# Patient Record
Sex: FEMALE | Race: BLACK OR AFRICAN AMERICAN | NOT HISPANIC OR LATINO | Employment: FULL TIME | URBAN - METROPOLITAN AREA
[De-identification: names, ages, dates, MRNs, and addresses within clinical notes are randomized per-mention and may not be internally consistent; named-entity substitution may affect disease eponyms.]

---

## 2024-04-12 ENCOUNTER — HOSPITAL ENCOUNTER (EMERGENCY)
Facility: HOSPITAL | Age: 39
Discharge: HOME | End: 2024-04-12
Attending: EMERGENCY MEDICINE

## 2024-04-12 VITALS
BODY MASS INDEX: 39.56 KG/M2 | SYSTOLIC BLOOD PRESSURE: 128 MMHG | DIASTOLIC BLOOD PRESSURE: 89 MMHG | HEIGHT: 62 IN | RESPIRATION RATE: 18 BRPM | OXYGEN SATURATION: 97 % | HEART RATE: 91 BPM | WEIGHT: 215 LBS | TEMPERATURE: 98 F

## 2024-04-12 DIAGNOSIS — M79.671 RIGHT FOOT PAIN: ICD-10-CM

## 2024-04-12 DIAGNOSIS — L03.115 CELLULITIS OF RIGHT FOOT: Primary | ICD-10-CM

## 2024-04-12 LAB
B-HCG UR QL: NEGATIVE
CTP QC/QA: YES

## 2024-04-12 PROCEDURE — 81025 URINE PREGNANCY TEST: CPT | Mod: ER | Performed by: EMERGENCY MEDICINE

## 2024-04-12 PROCEDURE — 81025 URINE PREGNANCY TEST: CPT | Mod: ER

## 2024-04-12 PROCEDURE — 99283 EMERGENCY DEPT VISIT LOW MDM: CPT | Mod: 25,ER

## 2024-04-12 RX ORDER — CEPHALEXIN 500 MG/1
500 CAPSULE ORAL EVERY 6 HOURS
Qty: 40 CAPSULE | Refills: 0 | Status: SHIPPED | OUTPATIENT
Start: 2024-04-12 | End: 2024-04-22

## 2024-04-12 NOTE — Clinical Note
"David Man" Claudette was seen and treated in our emergency department on 4/12/2024.  She may return to work on 04/15/2024.       If you have any questions or concerns, please don't hesitate to call.      LISSETTE Solitario RN    "

## 2024-04-13 NOTE — ED PROVIDER NOTES
"Encounter Date: 4/12/2024       History     Chief Complaint   Patient presents with    Foot Pain     Right foot pain described as "burning" sensation to area around lower 5th toe. Denies hx of gout or DM2.      39-year-old female presenting for evaluation of pain, redness, and itching to the lateral right forefoot.  Symptoms began several days ago.  Patient thought that she had athlete's foot and used over-the-counter topical medication for it last night, however pain has gotten worse and redness has spread.  Denies any injury.  No history of similar symptoms.  Denies fever, ankle pain, or any other symptoms.    The history is provided by the patient. No  was used.     Review of patient's allergies indicates:  No Known Allergies  No past medical history on file.  No past surgical history on file.  No family history on file.     Review of Systems   Constitutional:  Negative for chills, fatigue and fever.   HENT:  Negative for congestion, ear pain, nosebleeds, postnasal drip, rhinorrhea, sinus pressure and sore throat.    Eyes:  Negative for photophobia and pain.   Respiratory:  Negative for apnea, cough, choking, chest tightness, shortness of breath, wheezing and stridor.    Cardiovascular:  Negative for chest pain, palpitations and leg swelling.   Gastrointestinal:  Negative for abdominal pain, constipation, diarrhea, nausea and vomiting.   Genitourinary:  Negative for dysuria, flank pain, hematuria, pelvic pain and vaginal discharge.   Musculoskeletal:  Negative for arthralgias, back pain, gait problem and neck pain.        + right foot pain   Skin:  Positive for color change. Negative for pallor, rash and wound.   Neurological:  Negative for dizziness, seizures, syncope, facial asymmetry, light-headedness and headaches.   Hematological:  Negative for adenopathy.   Psychiatric/Behavioral:  Negative for confusion. The patient is not nervous/anxious.        Physical Exam     Initial Vitals " [04/12/24 1936]   BP Pulse Resp Temp SpO2   (!) 137/93 102 18 98.2 °F (36.8 °C) 95 %      MAP       --         Physical Exam    Nursing note and vitals reviewed.  Constitutional: She appears well-developed and well-nourished. She is not diaphoretic. No distress.   HENT:   Head: Normocephalic and atraumatic.   Right Ear: External ear normal.   Left Ear: External ear normal.   Nose: Nose normal.   Eyes: Conjunctivae and EOM are normal. Right eye exhibits no discharge. Left eye exhibits no discharge.   Neck: Neck supple. No tracheal deviation present.   Normal range of motion.  Cardiovascular:  Normal rate.           Pulmonary/Chest: No stridor. No respiratory distress.   Abdominal: Abdomen is soft. She exhibits no distension. There is no abdominal tenderness.   Musculoskeletal:         General: No tenderness. Normal range of motion.      Cervical back: Normal range of motion and neck supple.     Neurological: She is alert and oriented to person, place, and time. She has normal strength. No cranial nerve deficit or sensory deficit.   Skin: Skin is warm, dry and intact. There is erythema.   Erythema, warmth, tenderness to the right lateral forefoot just proximal to the 4th and 5th toes.  There is also mild associated swelling.  No wounds.   Psychiatric: She has a normal mood and affect. Her behavior is normal. Judgment and thought content normal.         ED Course   Procedures  Labs Reviewed   POCT URINE PREGNANCY          Imaging Results              X-Ray Foot Complete Right (Final result)  Result time 04/12/24 20:54:08      Final result by Eneida Avalos MD (04/12/24 20:54:08)                   Impression:      Dorsal soft tissue swelling.  No acute bony abnormality detected.      Electronically signed by: Eneida Avalos  Date:    04/12/2024  Time:    20:54               Narrative:    EXAMINATION:  THREE VIEWS OF THE RIGHT FOOT    CLINICAL HISTORY:  Pain in right foot    TECHNIQUE:  AP, lateral, and oblique  view of the right foot    COMPARISON:  None.    FINDINGS:  There is dorsal soft tissue swelling.  Three views of the right foot demonstrate no acute fracture or dislocation.  There is a well corticated os ossific or calcific density at the Achilles tendon insertion, which may suggest Achilles tendinitis.                                       Medications - No data to display  Medical Decision Making  HPI and physical exam as above.  X-ray without evidence of acute bony abnormality.  Findings concerning for cellulitis.  Will treat with antibiotics.  No evidence of emergent pathology.  Follow up with PCP.  Shared decision-making with the patient.    Amount and/or Complexity of Data Reviewed  Labs: ordered. Decision-making details documented in ED Course.  Radiology: ordered. Decision-making details documented in ED Course.    Risk  Prescription drug management.                                      Clinical Impression:  Final diagnoses:  [M79.671] Right foot pain  [L03.115] Cellulitis of right foot (Primary)          ED Disposition Condition    Discharge Stable          ED Prescriptions       Medication Sig Dispense Start Date End Date Auth. Provider    cephALEXin (KEFLEX) 500 MG capsule Take 1 capsule (500 mg total) by mouth every 6 (six) hours. for 10 days 40 capsule 4/12/2024 4/22/2024 Cam Hanson, JACINTO          Follow-up Information       Follow up With Specialties Details Why Contact [facility]    [facility]  Schedule an appointment as soon as possible for a visit in 1 week For further evaluation [address] [facility] [place] [id number] [phone]      [facility] ED Emergency Medicine Go to  If symptoms worsen, As needed [address] [facility] [id number] [phone]             Cam Hanson, NP  04/12/24 2537

## 2024-04-13 NOTE — DISCHARGE INSTRUCTIONS
Take antibiotics once every 6 hours (4 times a day) as prescribed until they are gone.  You should not have any left over even if your symptoms get better.    Return to the emergency department for any new or worsening symptoms.    Thank you for coming to our Emergency Department today. It is important to remember that some problems are difficult to diagnose and may not be found during your first visit. Be sure to follow up with your primary care doctor.  If you do not have one, you may contact the one listed on your discharge paperwork or you may also call the [facility] Appointment Desk at [phone] to schedule an appointment with one.     Return to the ER with any questions/concerns, new/concerning symptoms, worsening or failure to improve. Do not drive or make any important decisions for 24 hours if you have received any pain medications, sedatives or mood altering drugs during your ER visit.